# Patient Record
Sex: FEMALE | Race: WHITE | Employment: UNEMPLOYED | URBAN - METROPOLITAN AREA
[De-identification: names, ages, dates, MRNs, and addresses within clinical notes are randomized per-mention and may not be internally consistent; named-entity substitution may affect disease eponyms.]

---

## 2021-07-30 LAB
ABO, EXTERNAL RESULT: NORMAL
C. TRACHOMATIS, EXTERNAL RESULT: NEGATIVE
HEP B, EXTERNAL RESULT: NEGATIVE
HEPATITIS C ANTIBODY, EXTERNAL RESULT: NEGATIVE
HIV, EXTERNAL RESULT: NEGATIVE
N. GONORRHOEAE, EXTERNAL RESULT: NEGATIVE
RH FACTOR, EXTERNAL RESULT: POSITIVE
RPR, EXTERNAL RESULT: NON REACTIVE
RUBELLA TITER, EXTERNAL RESULT: NORMAL

## 2022-02-25 LAB — GBS, EXTERNAL RESULT: NEGATIVE

## 2022-03-07 ENCOUNTER — PREP FOR PROCEDURE (OUTPATIENT)
Dept: OBGYN | Age: 32
End: 2022-03-07

## 2022-03-07 RX ORDER — ONDANSETRON 2 MG/ML
4 INJECTION INTRAMUSCULAR; INTRAVENOUS EVERY 6 HOURS PRN
Status: CANCELLED | OUTPATIENT
Start: 2022-03-07

## 2022-03-07 RX ORDER — SODIUM CHLORIDE 0.9 % (FLUSH) 0.9 %
5-40 SYRINGE (ML) INJECTION PRN
Status: CANCELLED | OUTPATIENT
Start: 2022-03-07

## 2022-03-07 RX ORDER — SODIUM CHLORIDE 0.9 % (FLUSH) 0.9 %
5-40 SYRINGE (ML) INJECTION EVERY 12 HOURS SCHEDULED
Status: CANCELLED | OUTPATIENT
Start: 2022-03-07

## 2022-03-07 RX ORDER — SODIUM CHLORIDE, SODIUM LACTATE, POTASSIUM CHLORIDE, AND CALCIUM CHLORIDE .6; .31; .03; .02 G/100ML; G/100ML; G/100ML; G/100ML
1000 INJECTION, SOLUTION INTRAVENOUS PRN
Status: CANCELLED | OUTPATIENT
Start: 2022-03-07

## 2022-03-07 RX ORDER — SODIUM CHLORIDE 9 MG/ML
25 INJECTION, SOLUTION INTRAVENOUS PRN
Status: CANCELLED | OUTPATIENT
Start: 2022-03-07

## 2022-03-07 RX ORDER — SODIUM CHLORIDE, SODIUM LACTATE, POTASSIUM CHLORIDE, AND CALCIUM CHLORIDE .6; .31; .03; .02 G/100ML; G/100ML; G/100ML; G/100ML
500 INJECTION, SOLUTION INTRAVENOUS PRN
Status: CANCELLED | OUTPATIENT
Start: 2022-03-07

## 2022-03-07 RX ORDER — SODIUM CHLORIDE, SODIUM LACTATE, POTASSIUM CHLORIDE, CALCIUM CHLORIDE 600; 310; 30; 20 MG/100ML; MG/100ML; MG/100ML; MG/100ML
INJECTION, SOLUTION INTRAVENOUS CONTINUOUS
Status: CANCELLED | OUTPATIENT
Start: 2022-03-07

## 2022-03-11 ENCOUNTER — HOSPITAL ENCOUNTER (INPATIENT)
Age: 32
LOS: 1 days | Discharge: HOME | End: 2022-03-12
Attending: OBSTETRICS & GYNECOLOGY | Admitting: OBSTETRICS & GYNECOLOGY
Payer: COMMERCIAL

## 2022-03-11 ENCOUNTER — ANESTHESIA (OUTPATIENT)
Dept: LABOR AND DELIVERY | Age: 32
End: 2022-03-11
Payer: COMMERCIAL

## 2022-03-11 ENCOUNTER — ANESTHESIA EVENT (OUTPATIENT)
Dept: LABOR AND DELIVERY | Age: 32
End: 2022-03-11
Payer: COMMERCIAL

## 2022-03-11 ENCOUNTER — APPOINTMENT (OUTPATIENT)
Dept: LABOR AND DELIVERY | Age: 32
End: 2022-03-11
Payer: COMMERCIAL

## 2022-03-11 PROBLEM — Z98.890 STATUS POST INDUCTION OF LABOR: Status: ACTIVE | Noted: 2022-03-11

## 2022-03-11 LAB
ABO/RH: NORMAL
AMPHETAMINE SCREEN, URINE: NORMAL
ANTIBODY SCREEN: NORMAL
BARBITURATE SCREEN URINE: NORMAL
BASOPHILS ABSOLUTE: 0.1 K/UL (ref 0–0.2)
BASOPHILS RELATIVE PERCENT: 0.6 %
BENZODIAZEPINE SCREEN, URINE: NORMAL
BUPRENORPHINE URINE: NORMAL
CANNABINOID SCREEN URINE: NORMAL
COCAINE METABOLITE SCREEN URINE: NORMAL
EOSINOPHILS ABSOLUTE: 0.1 K/UL (ref 0–0.6)
EOSINOPHILS RELATIVE PERCENT: 0.6 %
HCT VFR BLD CALC: 35.4 % (ref 36–48)
HEMOGLOBIN: 11.9 G/DL (ref 12–16)
LYMPHOCYTES ABSOLUTE: 3.3 K/UL (ref 1–5.1)
LYMPHOCYTES RELATIVE PERCENT: 28.7 %
Lab: NORMAL
MCH RBC QN AUTO: 31.2 PG (ref 26–34)
MCHC RBC AUTO-ENTMCNC: 33.7 G/DL (ref 31–36)
MCV RBC AUTO: 92.5 FL (ref 80–100)
METHADONE SCREEN, URINE: NORMAL
MONOCYTES ABSOLUTE: 0.5 K/UL (ref 0–1.3)
MONOCYTES RELATIVE PERCENT: 4.4 %
NEUTROPHILS ABSOLUTE: 7.4 K/UL (ref 1.7–7.7)
NEUTROPHILS RELATIVE PERCENT: 65.7 %
OPIATE SCREEN URINE: NORMAL
OXYCODONE URINE: NORMAL
PDW BLD-RTO: 13 % (ref 12.4–15.4)
PH UA: 6
PHENCYCLIDINE SCREEN URINE: NORMAL
PLATELET # BLD: 296 K/UL (ref 135–450)
PMV BLD AUTO: 7.3 FL (ref 5–10.5)
PROPOXYPHENE SCREEN: NORMAL
RBC # BLD: 3.82 M/UL (ref 4–5.2)
SARS-COV-2, NAAT: NOT DETECTED
TOTAL SYPHILLIS IGG/IGM: NORMAL
WBC # BLD: 11.3 K/UL (ref 4–11)

## 2022-03-11 PROCEDURE — 1220000000 HC SEMI PRIVATE OB R&B

## 2022-03-11 PROCEDURE — 6360000002 HC RX W HCPCS: Performed by: OBSTETRICS & GYNECOLOGY

## 2022-03-11 PROCEDURE — 3E033VJ INTRODUCTION OF OTHER HORMONE INTO PERIPHERAL VEIN, PERCUTANEOUS APPROACH: ICD-10-PCS | Performed by: OBSTETRICS & GYNECOLOGY

## 2022-03-11 PROCEDURE — 80307 DRUG TEST PRSMV CHEM ANLYZR: CPT

## 2022-03-11 PROCEDURE — 0KQM0ZZ REPAIR PERINEUM MUSCLE, OPEN APPROACH: ICD-10-PCS | Performed by: OBSTETRICS & GYNECOLOGY

## 2022-03-11 PROCEDURE — 2580000003 HC RX 258: Performed by: OBSTETRICS & GYNECOLOGY

## 2022-03-11 PROCEDURE — 59025 FETAL NON-STRESS TEST: CPT

## 2022-03-11 PROCEDURE — 2500000003 HC RX 250 WO HCPCS: Performed by: ANESTHESIOLOGY

## 2022-03-11 PROCEDURE — 2500000003 HC RX 250 WO HCPCS: Performed by: NURSE ANESTHETIST, CERTIFIED REGISTERED

## 2022-03-11 PROCEDURE — 86780 TREPONEMA PALLIDUM: CPT

## 2022-03-11 PROCEDURE — 86901 BLOOD TYPING SEROLOGIC RH(D): CPT

## 2022-03-11 PROCEDURE — 6370000000 HC RX 637 (ALT 250 FOR IP): Performed by: OBSTETRICS & GYNECOLOGY

## 2022-03-11 PROCEDURE — 85025 COMPLETE CBC W/AUTO DIFF WBC: CPT

## 2022-03-11 PROCEDURE — 86850 RBC ANTIBODY SCREEN: CPT

## 2022-03-11 PROCEDURE — 10907ZC DRAINAGE OF AMNIOTIC FLUID, THERAPEUTIC FROM PRODUCTS OF CONCEPTION, VIA NATURAL OR ARTIFICIAL OPENING: ICD-10-PCS | Performed by: OBSTETRICS & GYNECOLOGY

## 2022-03-11 PROCEDURE — 86900 BLOOD TYPING SEROLOGIC ABO: CPT

## 2022-03-11 PROCEDURE — 87635 SARS-COV-2 COVID-19 AMP PRB: CPT

## 2022-03-11 PROCEDURE — 7200000001 HC VAGINAL DELIVERY

## 2022-03-11 PROCEDURE — 3700000025 EPIDURAL BLOCK: Performed by: ANESTHESIOLOGY

## 2022-03-11 RX ORDER — SODIUM CHLORIDE, SODIUM LACTATE, POTASSIUM CHLORIDE, CALCIUM CHLORIDE 600; 310; 30; 20 MG/100ML; MG/100ML; MG/100ML; MG/100ML
INJECTION, SOLUTION INTRAVENOUS CONTINUOUS
Status: DISCONTINUED | OUTPATIENT
Start: 2022-03-11 | End: 2022-03-11

## 2022-03-11 RX ORDER — ONDANSETRON 2 MG/ML
4 INJECTION INTRAMUSCULAR; INTRAVENOUS EVERY 6 HOURS PRN
Status: DISCONTINUED | OUTPATIENT
Start: 2022-03-11 | End: 2022-03-12 | Stop reason: HOSPADM

## 2022-03-11 RX ORDER — SODIUM CHLORIDE, SODIUM LACTATE, POTASSIUM CHLORIDE, AND CALCIUM CHLORIDE .6; .31; .03; .02 G/100ML; G/100ML; G/100ML; G/100ML
500 INJECTION, SOLUTION INTRAVENOUS PRN
Status: DISCONTINUED | OUTPATIENT
Start: 2022-03-11 | End: 2022-03-11

## 2022-03-11 RX ORDER — LIDOCAINE HYDROCHLORIDE AND EPINEPHRINE 15; 5 MG/ML; UG/ML
INJECTION, SOLUTION EPIDURAL PRN
Status: DISCONTINUED | OUTPATIENT
Start: 2022-03-11 | End: 2022-03-11 | Stop reason: SDUPTHER

## 2022-03-11 RX ORDER — SODIUM CHLORIDE 0.9 % (FLUSH) 0.9 %
5-40 SYRINGE (ML) INJECTION EVERY 12 HOURS SCHEDULED
Status: DISCONTINUED | OUTPATIENT
Start: 2022-03-11 | End: 2022-03-12 | Stop reason: HOSPADM

## 2022-03-11 RX ORDER — SODIUM CHLORIDE 0.9 % (FLUSH) 0.9 %
5-40 SYRINGE (ML) INJECTION PRN
Status: DISCONTINUED | OUTPATIENT
Start: 2022-03-11 | End: 2022-03-12 | Stop reason: HOSPADM

## 2022-03-11 RX ORDER — SODIUM CHLORIDE 9 MG/ML
25 INJECTION, SOLUTION INTRAVENOUS PRN
Status: DISCONTINUED | OUTPATIENT
Start: 2022-03-11 | End: 2022-03-12 | Stop reason: HOSPADM

## 2022-03-11 RX ORDER — LANOLIN 100 %
OINTMENT (GRAM) TOPICAL PRN
Status: DISCONTINUED | OUTPATIENT
Start: 2022-03-11 | End: 2022-03-12 | Stop reason: HOSPADM

## 2022-03-11 RX ORDER — OXYCODONE HYDROCHLORIDE 5 MG/1
5 TABLET ORAL EVERY 4 HOURS PRN
Status: DISCONTINUED | OUTPATIENT
Start: 2022-03-11 | End: 2022-03-12 | Stop reason: HOSPADM

## 2022-03-11 RX ORDER — IBUPROFEN 800 MG/1
800 TABLET ORAL EVERY 8 HOURS
Status: DISCONTINUED | OUTPATIENT
Start: 2022-03-11 | End: 2022-03-12 | Stop reason: HOSPADM

## 2022-03-11 RX ORDER — ACETAMINOPHEN 325 MG/1
650 TABLET ORAL EVERY 4 HOURS PRN
Status: DISCONTINUED | OUTPATIENT
Start: 2022-03-11 | End: 2022-03-12 | Stop reason: HOSPADM

## 2022-03-11 RX ORDER — NALBUPHINE HCL 10 MG/ML
5 AMPUL (ML) INJECTION EVERY 4 HOURS PRN
Status: DISCONTINUED | OUTPATIENT
Start: 2022-03-11 | End: 2022-03-11 | Stop reason: HOSPADM

## 2022-03-11 RX ORDER — DOCUSATE SODIUM 100 MG/1
100 CAPSULE, LIQUID FILLED ORAL 2 TIMES DAILY
Status: DISCONTINUED | OUTPATIENT
Start: 2022-03-11 | End: 2022-03-12 | Stop reason: HOSPADM

## 2022-03-11 RX ORDER — LIDOCAINE HYDROCHLORIDE 20 MG/ML
INJECTION, SOLUTION EPIDURAL; INFILTRATION; INTRACAUDAL; PERINEURAL PRN
Status: DISCONTINUED | OUTPATIENT
Start: 2022-03-11 | End: 2022-03-11 | Stop reason: SDUPTHER

## 2022-03-11 RX ORDER — NALOXONE HYDROCHLORIDE 0.4 MG/ML
0.4 INJECTION, SOLUTION INTRAMUSCULAR; INTRAVENOUS; SUBCUTANEOUS PRN
Status: DISCONTINUED | OUTPATIENT
Start: 2022-03-11 | End: 2022-03-11 | Stop reason: HOSPADM

## 2022-03-11 RX ORDER — IBUPROFEN 800 MG/1
800 TABLET ORAL EVERY 8 HOURS
Status: DISCONTINUED | OUTPATIENT
Start: 2022-03-12 | End: 2022-03-11

## 2022-03-11 RX ORDER — SODIUM CHLORIDE, SODIUM LACTATE, POTASSIUM CHLORIDE, AND CALCIUM CHLORIDE .6; .31; .03; .02 G/100ML; G/100ML; G/100ML; G/100ML
1000 INJECTION, SOLUTION INTRAVENOUS PRN
Status: DISCONTINUED | OUTPATIENT
Start: 2022-03-11 | End: 2022-03-11

## 2022-03-11 RX ADMIN — Medication 5 ML: at 10:43

## 2022-03-11 RX ADMIN — Medication 166.7 ML: at 11:39

## 2022-03-11 RX ADMIN — LIDOCAINE HYDROCHLORIDE AND EPINEPHRINE 3 ML: 15; 5 INJECTION, SOLUTION EPIDURAL at 10:33

## 2022-03-11 RX ADMIN — BENZOCAINE AND LEVOMENTHOL: 200; 5 SPRAY TOPICAL at 14:30

## 2022-03-11 RX ADMIN — ACETAMINOPHEN 650 MG: 325 TABLET ORAL at 21:07

## 2022-03-11 RX ADMIN — DOCUSATE SODIUM 100 MG: 100 CAPSULE, LIQUID FILLED ORAL at 19:25

## 2022-03-11 RX ADMIN — Medication 87.3 MILLI-UNITS/MIN: at 11:38

## 2022-03-11 RX ADMIN — WITCH HAZEL: 500 SOLUTION RECTAL; TOPICAL at 14:30

## 2022-03-11 RX ADMIN — Medication 1 MILLI-UNITS/MIN: at 06:26

## 2022-03-11 RX ADMIN — SODIUM CHLORIDE, POTASSIUM CHLORIDE, SODIUM LACTATE AND CALCIUM CHLORIDE: 600; 310; 30; 20 INJECTION, SOLUTION INTRAVENOUS at 06:23

## 2022-03-11 RX ADMIN — Medication 12 ML/HR: at 10:48

## 2022-03-11 RX ADMIN — SODIUM CHLORIDE, POTASSIUM CHLORIDE, SODIUM LACTATE AND CALCIUM CHLORIDE: 600; 310; 30; 20 INJECTION, SOLUTION INTRAVENOUS at 10:50

## 2022-03-11 RX ADMIN — Medication 5 ML: at 10:38

## 2022-03-11 RX ADMIN — LIDOCAINE HYDROCHLORIDE 5 ML: 20 INJECTION, SOLUTION EPIDURAL; INFILTRATION; INTRACAUDAL; PERINEURAL at 11:15

## 2022-03-11 RX ADMIN — SODIUM CHLORIDE, POTASSIUM CHLORIDE, SODIUM LACTATE AND CALCIUM CHLORIDE: 600; 310; 30; 20 INJECTION, SOLUTION INTRAVENOUS at 05:55

## 2022-03-11 ASSESSMENT — PAIN SCALES - GENERAL
PAINLEVEL_OUTOF10: 3
PAINLEVEL_OUTOF10: 0
PAINLEVEL_OUTOF10: 3

## 2022-03-11 ASSESSMENT — ENCOUNTER SYMPTOMS: SHORTNESS OF BREATH: 0

## 2022-03-11 NOTE — ANESTHESIA POSTPROCEDURE EVALUATION
Department of Anesthesiology  Postprocedure Note    Patient: Herbert Bradley  MRN: [id number]  YOB: 1990  Date of evaluation: 3/11/2022  Time:  1:28 PM     Procedure Summary     Date: 03/11/22 Room / Location:     Anesthesia Start: 1020 Anesthesia Stop: 1287    Procedure: Labor Analgesia Diagnosis:     Scheduled Providers:  Responsible Provider: Chari Saavedra MD    Anesthesia Type: epidural ASA Status: 2          Anesthesia Type: epidural    Stephanie Phase I: Stephanie Score: 9    Stephanie Phase II:      Last vitals: Reviewed and per EMR flowsheets.        Anesthesia Post Evaluation    Patient location during evaluation: bedside  Patient participation: complete - patient participated  Level of consciousness: awake and alert  Pain score: 1  Airway patency: patent  Nausea & Vomiting: no nausea and no vomiting  Complications: no  Cardiovascular status: hemodynamically stable  Respiratory status: room air, spontaneous ventilation and acceptable  Hydration status: stable    BP (!) 110/55   Pulse 79   Temp 36.5 °C (97.7 °F) (Oral)   Resp 16   Ht 5' 5\" (1.651 m)   Wt 200 lb (90.7 kg)   SpO2 96%   Breastfeeding Unknown   BMI 33.28 kg/m²

## 2022-03-11 NOTE — FLOWSHEET NOTE
Patient admitted to room 2286 for iol. Patient oriented to room, call light and plan of care. Patient given opportunity to read all appropriate consents. RN reviewed admission process, [facility] and infant safety/security processes and consents were signed. Patient verbalized understanding and questions were answered and addressed.

## 2022-03-11 NOTE — FLOWSHEET NOTE
03/11/22 0630   Fetal Heart Rate   Mode External US   Baseline Rate 140 bpm   Baseline Classification Normal   Variability 6-25 BPM   Pattern Accelerations   Patient Feels Fetal Movement Yes   Interventions RN at Bedside   OB Bladder Status Non-distended;Voiding   OB Bladder Intervention Voiding with Relief   Multiple birth? No   Fetal Monitoring Strip   FMS Reviewed? Yes   FMS Reviewed By? NS,TW   Uterine Activity   UA Mode Palpation; JAARS   Contraction Frequency   ( irritability)   Contraction Duration   (irritability)   Contraction Intensity Mild; Moderate   Resting Tone Palpated Soft     NST

## 2022-03-11 NOTE — L&D DELIVERY SUMMARY NOTE
Department of Obstetrics and Gynecology  Spontaneous Vaginal Delivery Note      Pre-operative Diagnosis:  Term pregnancy and Induced labor    Post-operative Diagnosis:  Living  infant(s) and Female    Information for the patient's :  Rilla Rash Girl Con Pew [[id number]]                    Infant Wt:   Information for the patient's :  Mo Rash Girl Con Pew [[id number]]           APGARS:     Information for the patient's :  Tari Lau [[id number]]           Anesthesia:  epidural anesthesia    Application and Delivery:  26 yo  at 44 1/7 weeks, presents for IOL. AROM and pitocin. Progressed well to complete.  over 2nd degree lac. Viable female, nuchal cord x 1 loose. Delayed cord clamping, skin to skin. Placenta spont intact, uterus explored. Repair with 2-0,3-0 vicryl.  ml. Delivery Summary:       Specimen:  Placenta sent to pathology     Intake/Output:     Date 03/10/22 0701 - 22 07(Not Admitted) 22 - 22 0700   Shift 8945-4880 9964-9697 24 Hour Total 7344-9365 9118-6821 24 Hour Total   INTAKE   I.V.    10.8  10.8   Shift Total    10.8  10.8   OUTPUT   Urine    850  850   Blood    250  250     Quantitative Blood Loss (mL)    250  250   Shift Total    1100  1100   NET    -1089.2  -1089. 2       Condition:  infant stable to general nursery and mother stable    Blood Type and Rh: O POS        Rubella Immunity Status:   Immune           Infant Feeding:    both breast and bottle     Attending Attestation: I performed the procedure.

## 2022-03-11 NOTE — H&P
Department of Obstetrics and Gynecology   Obstetrics History and Physical        CHIEF COMPLAINT:  induction    HISTORY OF PRESENT ILLNESS:    Leda Salter  is a 27 y.o.  female at 36w3d presents with a chief complaint as above and is being admitted for induction    Estimated Due Date: Estimated Date of Delivery: 3/17/22    PRENATAL CARE: Complicated by: none    PAST OB HISTORY:  OB History        2    Para   1    Term   1            AB        Living   1       SAB        IAB        Ectopic        Molar        Multiple        Live Births   1              Past Medical History:        Diagnosis Date    Abnormal Pap smear of cervix     Anemia     GERD (gastroesophageal reflux disease)      Past Surgical History:    History reviewed. No pertinent surgical history. Allergies:  Penicillins  Social History:    Social History     Socioeconomic History    Marital status:      Spouse name: Not on file    Number of children: Not on file    Years of education: Not on file    Highest education level: Not on file   Occupational History    Not on file   Tobacco Use    Smoking status: Never Smoker    Smokeless tobacco: Never Used   Substance and Sexual Activity    Alcohol use: Not Currently     Comment: not while pregnant    Drug use: Never    Sexual activity: Yes     Partners: Male   Other Topics Concern    Not on file   Social History Narrative    Not on file     Social Determinants of Health     Financial Resource Strain:     Difficulty of Paying Living Expenses: Not on file   Food Insecurity:     Worried About Running Out of Food in the Last Year: Not on file    Gloria of Food in the Last Year: Not on file   Transportation Needs:     Lack of Transportation (Medical): Not on file    Lack of Transportation (Non-Medical):  Not on file   Physical Activity:     Days of Exercise per Week: Not on file    Minutes of Exercise per Session: Not on file   Stress:     Feeling of Stress : Not on file   Social Connections:     Frequency of Communication with Friends and Family: Not on file    Frequency of Social Gatherings with Friends and Family: Not on file    Attends Druze Services: Not on file    Active Member of Clubs or Organizations: Not on file    Attends Club or Organization Meetings: Not on file    Marital Status: Not on file   Intimate Partner Violence:     Fear of Current or Ex-Partner: Not on file    Emotionally Abused: Not on file    Physically Abused: Not on file    Sexually Abused: Not on file   Housing Stability:     Unable to Pay for Housing in the Last Year: Not on file    Number of [place] in the Last Year: Not on file    Unstable Housing in the Last Year: Not on file     Family History:       Problem Relation Age of Onset    Seizures Mother     Hypothyroidism Mother     High Cholesterol Mother     High Blood Pressure Father      Medications Prior to Admission:  Medications Prior to Admission: Prenatal Vit-Fe Fumarate-FA (PRENATAL 1+1 PO), Take by mouth    REVIEW OF SYSTEMS:  negative     PHYSICAL EXAM:    Vitals:    03/11/22 0533 03/11/22 0534 03/11/22 0625 03/11/22 0700   BP: 133/86  126/76 128/85   Pulse: 96  82 86   Resp: 16  18 17   Temp: 98 °F (36.7 °C)      TempSrc: Oral      SpO2: 98%      Weight:  200 lb (90.7 kg)     Height:  5' 5\" (1.651 m)       General appearance:  awake, alert, cooperative, no apparent distress, and appears stated age  Neurologic:  Awake, alert, oriented to name, place and time. Lungs:  No increased work of breathing, good air exchange  Abdomen:  Soft, non tender, gravid, fundal height consistent with the gestational age, EFW by Leopold's maneuvers is 7 lbs. ,  8 oz.   Pelvis:  Adequate pelvis  Cervix: 4/60/-1  Contraction frequency: none  Membranes:  Intact  Labs:   CBC:   Lab Results   Component Value Date    WBC 11.3 03/11/2022    RBC 3.82 03/11/2022    HGB 11.9 03/11/2022    HCT 35.4 03/11/2022    MCV 92.5 03/11/2022    MCH 31.2 03/11/2022    MCHC 33.7 03/11/2022    RDW 13.0 03/11/2022     03/11/2022    MPV 7.3 03/11/2022       ASSESSMENT:  Status post induction of labor    PLAN: Admit  Labor: Routine labor orders  Fetus: Reassuring  GBS: Negative    I have presented reasonable alternatives to the patient's proposed care, treatment, and services. The discussion I have done encompassed risks, benefits, and side effects related to the alternatives and the risks related to not receiving the proposed care, treatment, and services. All questions answered. Patient wishes to proceed. The surgical site was confirmed by the patient and me.       Electronically signed by Bill Henry MD on 3/11/2022 at 7:53 AM

## 2022-03-11 NOTE — FLOWSHEET NOTE
Dr. Guallpa Setting at bedside, SVE performed, 4/70/-3. AROM, mod amt clear fluid noted, pt tolerated well. Pt has no needs at this time.

## 2022-03-11 NOTE — FLOWSHEET NOTE
Bedside report received from JAYDON James and JAYDON Dang. Pitocin infusion verified. Patient awake and alert, states she is feeling her contractions and they are a little uncomfortable. Denies needs at this time. FOB at bedside. Call light within reach. White board updated.

## 2022-03-11 NOTE — ANESTHESIA PRE PROCEDURE
Department of Anesthesiology  Preprocedure Note       Name:  Gus Sellar   Age:  27 y.o.  :  1990                                          MRN:  [id number]         Date:  3/11/2022        Procedure: Labor Epidural    Medications prior to admission:   Prior to Admission medications    Medication Sig Start Date End Date Taking? Authorizing Provider   Prenatal Vit-Fe Fumarate-FA (PRENATAL 1+1 PO) Take by mouth   Yes Historical Provider, MD       Current medications:    Current Facility-Administered Medications   Medication Dose Route Frequency Provider Last Rate Last Admin    0.9 % sodium chloride infusion  25 mL IntraVENous PRN Robbie Cuevas MD        lactated ringers bolus  500 mL IntraVENous PRN Robbie Cuevas MD        Or    lactated ringers bolus  1,000 mL IntraVENous PRN Robbie Cuevas MD        lactated ringers infusion   IntraVENous Continuous Robbie Cuevas  mL/hr at 22 0623 New Bag at 22 0623    ondansetron (ZOFRAN) injection 4 mg  4 mg IntraVENous Q6H PRN Robbie Cuevas MD        oxytocin (PITOCIN) 30 units in 500 mL infusion  1-20 tu-units/min IntraVENous Continuous Robbie Cuevas MD 2 mL/hr at 22 0718 2 tu-units/min at 22 0718    oxytocin (PITOCIN) 30 units in 500 mL infusion  87.3 tu-units/min IntraVENous Continuous PRN Robbie Cuevas MD        And    oxytocin (PITOCIN) 10 unit bolus from the bag  10 Units IntraVENous PRN Robbie Cuevas MD        sodium chloride flush 0.9 % injection 5-40 mL  5-40 mL IntraVENous 2 times per day Robbie Cuevas MD        sodium chloride flush 0.9 % injection 5-40 mL  5-40 mL IntraVENous PRN Robbie Cuevas MD           Allergies:     Allergies   Allergen Reactions    Penicillins Rash       Problem List:    Patient Active Problem List   Diagnosis Code    Status post induction of labor Z98.890       Past Medical History: Diagnosis Date    Abnormal Pap smear of cervix     Anemia     GERD (gastroesophageal reflux disease)        Past Surgical History:  History reviewed. No pertinent surgical history. Social History:    Social History     Tobacco Use    Smoking status: Never Smoker    Smokeless tobacco: Never Used   Substance Use Topics    Alcohol use: Not Currently     Comment: not while pregnant                                Counseling given: Not Answered      Vital Signs (Current):   Vitals:    03/11/22 0533 03/11/22 0534 03/11/22 0625 03/11/22 0700   BP: 133/86  126/76 128/85   Pulse: 96  82 86   Resp: 16  18 17   Temp: 36.7 °C (98 °F)      TempSrc: Oral      SpO2: 98%      Weight:  200 lb (90.7 kg)     Height:  5' 5\" (1.651 m)                                                BP Readings from Last 3 Encounters:   03/11/22 128/85       NPO Status:                                                                                 BMI:   Wt Readings from Last 3 Encounters:   03/11/22 200 lb (90.7 kg)     Body mass index is 33.28 kg/m².     CBC:   Lab Results   Component Value Date    WBC 11.3 03/11/2022    RBC 3.82 03/11/2022    HGB 11.9 03/11/2022    HCT 35.4 03/11/2022    MCV 92.5 03/11/2022    RDW 13.0 03/11/2022     03/11/2022       COVID-19 Screening (If Applicable):   Lab Results   Component Value Date    COVID19 Not Detected 03/11/2022           Anesthesia Evaluation  Patient summary reviewed and Nursing notes reviewed no history of anesthetic complications:   Airway: Mallampati: II  TM distance: >3 FB   Neck ROM: full  Mouth opening: > = 3 FB Dental: normal exam         Pulmonary:Negative Pulmonary ROS and normal exam  breath sounds clear to auscultation      (-) asthma, shortness of breath and recent URI                           Cardiovascular:Negative CV ROS        (-) hypertension and CAD      Rhythm: regular  Rate: normal                    Neuro/Psych:   Negative Neuro/Psych ROS GI/Hepatic/Renal: Neg GI/Hepatic/Renal ROS  (+) GERD: well controlled,      (-) liver disease and no renal disease       Endo/Other: Negative Endo/Other ROS   (+) blood dyscrasia: anemia:., .    (-) diabetes mellitus               Abdominal:             Vascular: negative vascular ROS. - DVT and PE. Other Findings:             Anesthesia Plan      epidural     ASA 2     (Plan for a labor epidural. Plan and risks discussed with patient including but not limited to changes in HR, B/P and oxygen status; N/V; infection; headache; inadequate block or need to replace epidural. Questions answered. Patient agrees to [address] [place] [address].       )        Anesthetic plan and risks discussed with patient. OB History        2    Para   1    Term   1            AB        Living   1       SAB        IAB        Ectopic        Molar        Multiple        Live Births   Gabi is a 27y.o. year-old female admitted to Brandon Mireles MD for elective IOL. Gestational age is 36w3d. Her Body mass index is 33.28 kg/m². She was seen, examined and her chart was reviewed (including anesthesia, drug and allergy history). No interval changes are noted to her history and physical examination. (except as noted above).     Risks/benefits/alternatives of both neuraxial and general anesthesia were discussed and she agrees to proceed at the direction of the care team.    NAILA Garcia CRNA  2022  8:38 AM      NAILA Garcia CRNA   3/11/2022

## 2022-03-11 NOTE — LACTATION NOTE
This note was copied from a baby's chart. LC to room. Mother wishes to exclusively pump and bottle feed both formula and EBM. Patient declines wanting to use [facility]. Gave care plan for exclusive pumping. Discussed tips and milk storage guidelines. Mother is using Spectra S1 breastpump. Mother asked if I would help her get fitted for correct flange size. Would recommend using size 24 mm, and if problems arise, size down to 21 mm. Encouraged mother to pump at least 8 times per 24 hours. Encouraged skin to skin contact. Mother attempted breastfeeding with last child but ended up mostly pumping. Gave mother resources to find help with exclusive pumping after discharge if needed.

## 2022-03-11 NOTE — FLOWSHEET NOTE
Patient has ambulated with contact guard assist x1 to bathroom and was able to void easily. Steady gait to sink to wash hands and back to bed. Hans Bodarian has just finished lactation assistance and education. Patient has no needs or complaints at this time.

## 2022-03-11 NOTE — ANESTHESIA PROCEDURE NOTES
Epidural Block    Patient location during procedure: OB  Start time: 3/11/2022 10:29 AM  End time: 3/11/2022 10:49 AM  Reason for block: labor epidural  Staffing  Performed: resident/CRNA   Anesthesiologist: Davina Grace MD  Resident/CRNA: NAILA Richardson CRNA  Preanesthetic Checklist  Completed: patient identified, IV checked, site marked, risks and benefits discussed, surgical consent, monitors and equipment checked, pre-op evaluation, timeout performed, anesthesia consent given, oxygen available and patient being monitored  Epidural  Patient position: sitting  Prep: ChloraPrep and site prepped and draped  Patient monitoring: continuous pulse ox and frequent blood pressure checks  Approach: midline  Location: lumbar (1-5)  Injection technique: OLI saline  Provider prep: mask and sterile gloves  Needle  Needle type: Tuohy   Needle gauge: 17 G  Needle length: 3.5 in  Needle insertion depth: 5 cm  Catheter type: side hole  Catheter size: 19 G  Catheter at skin depth: 11 cm  Test dose: negative  Assessment  Sensory level: T10  Hemodynamics: stable  Attempts: 1  Additional Notes  Consent:  Risks and benefits of the labor epidural were discussed and the patient was given the opportunity to ask questions. Informed consent was obtained. Timeout:  A \"time out\" was performed immediately prior to the start of the epidural procedure.  The patient, site, procedure and provider were correctly identified.  Allergies were reviewed.  All members of the team and the patient participated in the time out. Procedure  Skin wheal with Lidocaine 1% 3 mL @ L3-L4. Loss of resistance with 3 mL of normal saline to expand the epidural space. denies paresthesia. Intentional dural puncture (DPE technique) with 25 G Pencan Spinal needle. CSF  positive, Blood: negative. Spinal needle removed and epidural catheter threaded without difficulty. Test dose negative with (3ml 1. 5%Lidocaine with 1:200,000 Epinephrine)  Occlusive dressing; steri strips, tegaderm and tape applied using aseptic technique. Attempts: 1   Difficulties/Complications: None    The patient was returned to the supine position with her head of bed elevated 30 degrees and left uterine displacement. She tolerated the epidural placement and dosing well. RN remains at bedside to monitor patient.       NAILA Rico - CRNA  11:01 AM

## 2022-03-11 NOTE — FLOWSHEET NOTE
Patient sat up to side of bed. No complaint of dizziness or lightheadedness. Epidural catheter removed, tip intact, dressing applied, patient tolerated well. Patient stood up at bedside with contact guard assist x1, no complaint of dizziness. Patient ambulated to bathroom with assist x1, LLE slightly numb still. Patient unable to void. Educated patient on phyllis care and use of phyllis meds, she was able to demonstrate understanding. Gown changed. Patient up to sink to wash hands. Ambulated to wheelchair with assit x1. Infant placed in mothers and both transferred to [facility] room 2261. Oriented patient to PP unit, room, call light, menu and meal ordering, toileting, bassinet, education binder, feeding log, white board, etc.  White board updated. Ice water provided. FOB at bedside. Call light within reach. No further needs at this time.

## 2022-03-11 NOTE — FLOWSHEET NOTE
of viable female infant at 65. Dried and stimulated, vigorous cry noted. Cord clamping delayed 1 minute then clamed and cut. Hat and diaper placed and infant placed skin to skin with mother at this time.

## 2022-03-11 NOTE — FLOWSHEET NOTE
Patient called out asking for epidural.  Wilbur Cavanaugh CRNA notified. IVF bolus started. Patient up to bathroom to void and sat up for epi placement.

## 2022-03-11 NOTE — FLOWSHEET NOTE
Patient reports contraction pain a 5-6/10 at this point. She is feeling rectal pressure. SVE performed, 10/100/+2. Dr. Samina Almanzar notified.

## 2022-03-11 NOTE — FLOWSHEET NOTE
Patient ambulatory to room 2286 for induction of labor. Patient and family oriented to room. Call light explained and provided. EFM applied with consent to central monitor bank with alarms on. Uterus soft and non-tender.

## 2022-03-12 VITALS
WEIGHT: 200 LBS | HEIGHT: 65 IN | TEMPERATURE: 98 F | DIASTOLIC BLOOD PRESSURE: 83 MMHG | HEART RATE: 77 BPM | OXYGEN SATURATION: 98 % | BODY MASS INDEX: 33.32 KG/M2 | RESPIRATION RATE: 18 BRPM | SYSTOLIC BLOOD PRESSURE: 121 MMHG

## 2022-03-12 PROCEDURE — 6370000000 HC RX 637 (ALT 250 FOR IP): Performed by: OBSTETRICS & GYNECOLOGY

## 2022-03-12 RX ADMIN — DOCUSATE SODIUM 100 MG: 100 CAPSULE, LIQUID FILLED ORAL at 10:00

## 2022-03-12 RX ADMIN — IBUPROFEN 800 MG: 800 TABLET, FILM COATED ORAL at 12:34

## 2022-03-12 RX ADMIN — IBUPROFEN 800 MG: 800 TABLET, FILM COATED ORAL at 05:02

## 2022-03-12 ASSESSMENT — PAIN SCALES - GENERAL
PAINLEVEL_OUTOF10: 4
PAINLEVEL_OUTOF10: 3

## 2022-03-12 NOTE — PLAN OF CARE
Problem: Discharge Planning:  Goal: Discharged to appropriate level of care  Description: Discharged to appropriate level of care  3/12/2022 1217 by Radha Hansen RN  Outcome: Completed  3/12/2022 0512 by Adin Woodard RN  Outcome: Ongoing     Problem: Constipation:  Goal: Bowel elimination is within specified parameters  Description: Bowel elimination is within specified parameters  3/12/2022 1217 by Radha Hansen RN  Outcome: Completed  3/12/2022 0512 by Adin Woodard RN  Outcome: Ongoing     Problem: Fluid Volume - Imbalance:  Goal: Absence of imbalanced fluid volume signs and symptoms  Description: Absence of imbalanced fluid volume signs and symptoms  3/12/2022 1217 by Radha Hansen RN  Outcome: Completed  3/12/2022 0512 by Adin Woodard RN  Outcome: Ongoing  Goal: Absence of postpartum hemorrhage signs and symptoms  Description: Absence of postpartum hemorrhage signs and symptoms  3/12/2022 1217 by Radha Hansen RN  Outcome: Completed  3/12/2022 0512 by Adin Woodard RN  Outcome: Ongoing     Problem: Infection - Risk of, Puerperal Infection:  Goal: Will show no infection signs and symptoms  Description: Will show no infection signs and symptoms  3/12/2022 1217 by Radha Hansen RN  Outcome: Completed  3/12/2022 0512 by Adin Woodard RN  Outcome: Ongoing     Problem: Mood - Altered:  Goal: Mood stable  Description: Mood stable  3/12/2022 1217 by Radha Hansen RN  Outcome: Completed  3/12/2022 0512 by Adin Woodard RN  Outcome: Ongoing     Problem: Pain - Acute:  Goal: Pain level will decrease  Description: Pain level will decrease  3/12/2022 1217 by Radha Hansen RN  Outcome: Completed  3/12/2022 0512 by Adin Woodard RN  Outcome: Ongoing

## 2022-03-12 NOTE — PLAN OF CARE
Problem: Discharge Planning:  Goal: Discharged to appropriate level of care  Description: Discharged to appropriate level of care  3/12/2022 1217 by Adolfo Monroe RN  Outcome: Completed  3/12/2022 0512 by Estevan Plata RN  Outcome: Ongoing     Problem: Constipation:  Goal: Bowel elimination is within specified parameters  Description: Bowel elimination is within specified parameters  3/12/2022 1217 by Adolfo Monroe RN  Outcome: Completed  3/12/2022 0512 by Estevan Plata RN  Outcome: Ongoing     Problem: Fluid Volume - Imbalance:  Goal: Absence of imbalanced fluid volume signs and symptoms  Description: Absence of imbalanced fluid volume signs and symptoms  3/12/2022 1217 by Adolfo Monroe RN  Outcome: Completed  3/12/2022 0512 by Estevan Plata RN  Outcome: Ongoing  Goal: Absence of postpartum hemorrhage signs and symptoms  Description: Absence of postpartum hemorrhage signs and symptoms  3/12/2022 1217 by Adolfo Monroe RN  Outcome: Completed  3/12/2022 0512 by Estevan Plata RN  Outcome: Ongoing     Problem: Infection - Risk of, Puerperal Infection:  Goal: Will show no infection signs and symptoms  Description: Will show no infection signs and symptoms  3/12/2022 1217 by Adolfo Monroe RN  Outcome: Completed  3/12/2022 0512 by Estevan Plata RN  Outcome: Ongoing     Problem: Mood - Altered:  Goal: Mood stable  Description: Mood stable  3/12/2022 1217 by Adolfo Monroe RN  Outcome: Completed  3/12/2022 0512 by Estevan Plata RN  Outcome: Ongoing     Problem: Pain - Acute:  Goal: Pain level will decrease  Description: Pain level will decrease  3/12/2022 1217 by Adolfo oMnroe RN  Outcome: Completed  3/12/2022 0512 by Estevan Plata RN  Outcome: Ongoing

## 2022-03-12 NOTE — FLOWSHEET NOTE
Assessment completed at bedside. VSS. Fundus firm at midline. Pt with minimal bleeding. Pt eating, drinking and urinating well on own. Pain controlled at this time with PRN pain meds. Medication side effects discussed, mom without questions at this time. Discussed plan of care with pt and FOB. Bonding well. Call light within reach. No needs at this time. Will monitor. Encouraged pt to call with any needs.

## 2022-03-12 NOTE — FLOWSHEET NOTE
CBC order verified with Dr. Alida Rosales and order received to discontinue. Will continue to monitor.

## 2022-03-12 NOTE — FLOWSHEET NOTE
ID bands checked. Infant's ID band and Mother's matching ID bands removed and taped to footprint sheet, the mother verified as correct and witnessed by RN. Security puck removed. Discharge teaching complete, discharge instructions signed, & parent/guardians deny questions regarding infant care at time of discharge. Parents verbalized understanding to follow-up with the OB in 6 weeks and pediatrician Tuesday as recommended on the discharge instructions. Parents verbalize understanding to follow up with in the morning here for a bili check. Infant placed in car seat by mother. Discharged in stable condition in car seat. Mother ambulatory to car.

## 2022-03-12 NOTE — FLOWSHEET NOTE
Plan of care and infant safety reviewed. Patient verbalized understanding. Infant asleep in mom's arms with resp easy at this time. White board updated.

## 2022-03-12 NOTE — ANESTHESIA POSTPROCEDURE EVALUATION
Department of Anesthesiology  Postprocedure Note    Patient: Gabo Sanchez  MRN: [id number]  YOB: 1990  Date of evaluation: 3/12/2022  Time:  2:03 PM     Procedure Summary     Date: 03/11/22 Room / Location:     Anesthesia Start: 1020 Anesthesia Stop: 5480    Procedure: Labor Analgesia Diagnosis:     Scheduled Providers:  Responsible Provider: Renetta Louie MD    Anesthesia Type: epidural ASA Status: 2          Anesthesia Type: epidural    Stephanie Phase I: Stephanie Score: 9    Stephanie Phase II: Stephanie Score: 10    Last vitals: Reviewed and per EMR flowsheets.        Anesthesia Post Evaluation    Patient location during evaluation: floor  Patient participation: complete - patient participated  Level of consciousness: awake and alert  Pain score: 2  Airway patency: patent  Nausea & Vomiting: no vomiting and no nausea  Complications: no  Cardiovascular status: hemodynamically stable  Respiratory status: room air, spontaneous ventilation and acceptable  Hydration status: stable

## 2022-03-12 NOTE — DISCHARGE SUMMARY
Department of Obstetrics and Gynecology  Postpartum Discharge Summary      Admit Date: 3/11/2022    Admit Diagnosis: Status post induction of labor [Z98.890]    Discharge Date:   Any delay in discharge from ordered D/C date due to  factors. Discharge Diagnoses: spontaneous vaginal delivery       Medication List      CONTINUE taking these medications    PRENATAL 1+1 PO            Service: Obstetrics    Consults: none    Significant Diagnostic Studies: none    Postpartum complications: none     Condition at Discharge: good    Hospital Course: uncomplicated    Discharge Instructions: Activity: as tolerated    Diet: regular diet    Instructions: No intercourse and nothing in the vagina for 6 weeks. Do not drive while using pain medications.  Keep any wounds clean and dry    Discharge to: Home    Disposition / Follow up: Return to office in 6 weeks    Home Health Nurse visit within 24-48 h if qualifies     Data:  Apgars:  Information for the patient's :  Damiánh Cordial [[id number]]   APGAR One: 8     Information for the patient's :  Melaniejayah Cordial [[id number]]   APGAR Five: 9     Birth Weight:  Information for the patient's :  Damiánh Cordial [[id number]]   Birth Weight: 8 lb 2.5 oz (3.7 kg)     Home with mother    Electronically signed by Gloria Vigil MD on 3/12/2022 at 6:43 AM

## 2022-03-12 NOTE — PROGRESS NOTES
Department of Obstetrics and Gynecology  Vaginal Delivery Postpartum Rounds    SUBJECTIVE:  Pain is controlled with Tylenol or non-steroidal anti-inflammatory drugs. Her lochia is normal.    OBJECTIVE:  Vital Signs: /76   Pulse 74   Temp 97.9 °F (36.6 °C) (Oral)   Resp 18   Ht 5' 5\" (1.651 m)   Wt 200 lb (90.7 kg)   SpO2 100%   Breastfeeding Unknown   BMI 33.28 kg/m²   Appearance/Psychiatric: awake, alert, cooperative, no apparent distress, appears stated age  Constitutional: The patient is well nourished. Cardiovascular: She does not have edema. Respiratory: Respiratory effort is normal.  Gastrointestinal: Soft, non tender, uterine fundus is firm below umbilicus  Extremities: nontender to palpation  Perineum: intact     Lab Results   Component Value Date    WBC 11.3 2022    RBC 3.82 2022    HGB 11.9 2022    HCT 35.4 2022    MCV 92.5 2022    MCH 31.2 2022    MCHC 33.7 2022    RDW 13.0 2022     2022    MPV 7.3 2022     No results found for: NA, K, CL, CO2, BUN, CREATININE, GLUCOSE, CALCIUM  No results found for: POCGLU  No results found for: ALKPHOS, ALT, AST, PROT, BILITOT, BILIDIR, LABALBU  Lab Results   Component Value Date    PHUR 6.0 2022     No results found for: LABRPR    ASSESSMENT:    Postpartum Day 1 s/p     PLAN:   1. Continue routine postpartum care   2. Discharge home on Postpartum Day 1  3. Return to office in 6 weeks   4.  Postpartum instructions reviewed and all patient's Questions answered    Electronically signed by Kimberley Bar MD on 3/12/2022 at 6:42 AM